# Patient Record
Sex: MALE | Race: WHITE | NOT HISPANIC OR LATINO | Employment: FULL TIME | URBAN - METROPOLITAN AREA
[De-identification: names, ages, dates, MRNs, and addresses within clinical notes are randomized per-mention and may not be internally consistent; named-entity substitution may affect disease eponyms.]

---

## 2018-04-07 ENCOUNTER — APPOINTMENT (EMERGENCY)
Dept: CT IMAGING | Facility: HOSPITAL | Age: 58
End: 2018-04-07
Payer: COMMERCIAL

## 2018-04-07 ENCOUNTER — HOSPITAL ENCOUNTER (EMERGENCY)
Facility: HOSPITAL | Age: 58
Discharge: HOME | End: 2018-04-07
Attending: EMERGENCY MEDICINE | Admitting: EMERGENCY MEDICINE
Payer: COMMERCIAL

## 2018-04-07 VITALS
BODY MASS INDEX: 34.53 KG/M2 | HEIGHT: 70 IN | OXYGEN SATURATION: 94 % | HEART RATE: 68 BPM | WEIGHT: 241.18 LBS | RESPIRATION RATE: 18 BRPM | SYSTOLIC BLOOD PRESSURE: 127 MMHG | DIASTOLIC BLOOD PRESSURE: 89 MMHG | TEMPERATURE: 97.7 F

## 2018-04-07 DIAGNOSIS — M54.41 ACUTE RIGHT-SIDED LOW BACK PAIN WITH RIGHT-SIDED SCIATICA: Primary | ICD-10-CM

## 2018-04-07 LAB
EXT BILIRUBIN, UA: NEGATIVE
EXT BLOOD URINE: NEGATIVE
EXT GLUCOSE, UA: 250
EXT KETONES: NEGATIVE
EXT NITRITE, UA: NEGATIVE
EXT PH, UA: 7
EXT PROTEIN, UA: NEGATIVE
EXT SPECIFIC GRAVITY, UA: 1.01
EXT UROBILINOGEN: 0.2
WBC # BLD EST: NEGATIVE 10*3/UL

## 2018-04-07 PROCEDURE — 72131 CT LUMBAR SPINE W/O DYE: CPT

## 2018-04-07 PROCEDURE — 96375 TX/PRO/DX INJ NEW DRUG ADDON: CPT

## 2018-04-07 PROCEDURE — 81002 URINALYSIS NONAUTO W/O SCOPE: CPT | Performed by: PHYSICIAN ASSISTANT

## 2018-04-07 PROCEDURE — 96374 THER/PROPH/DIAG INJ IV PUSH: CPT

## 2018-04-07 PROCEDURE — 99284 EMERGENCY DEPT VISIT MOD MDM: CPT

## 2018-04-07 RX ORDER — KETOROLAC TROMETHAMINE 30 MG/ML
30 INJECTION, SOLUTION INTRAMUSCULAR; INTRAVENOUS ONCE
Status: COMPLETED | OUTPATIENT
Start: 2018-04-07 | End: 2018-04-07

## 2018-04-07 RX ORDER — OXYCODONE HYDROCHLORIDE AND ACETAMINOPHEN 5; 325 MG/1; MG/1
1 TABLET ORAL EVERY 4 HOURS PRN
Qty: 18 TABLET | Refills: 0 | Status: SHIPPED | OUTPATIENT
Start: 2018-04-07 | End: 2018-04-10

## 2018-04-07 RX ADMIN — HYDROMORPHONE HYDROCHLORIDE 0.5 MG: 1 INJECTION, SOLUTION INTRAMUSCULAR; INTRAVENOUS; SUBCUTANEOUS at 16:30

## 2018-04-07 RX ADMIN — KETOROLAC TROMETHAMINE 30 MG: 30 INJECTION, SOLUTION INTRAMUSCULAR at 16:27

## 2018-04-07 NOTE — ED PROVIDER NOTES
History  Chief Complaint   Patient presents with    Back Pain     Patient c/o right lower back pain that radiates down right side of leg along with numbness and tingling  62 y o  male with past medical history significant for asthma and chronic left sided back pain with sciatica presents to the Emergency Department with chief complaint of right sided back pain  Onset of symptoms is reported as 1 5 weeks ago  Location of symptoms is reported as right lower back with radiation down buttocks and down right leg  Quality of symptoms is reported as sharp tight pain in back with sharp shooting pain and numbness to right leg  Severity of symptoms is reported as moderate-severe  Associated symptoms:  Denies urinary retention  Denies bowel or bladder incontinence  Denies abdominal pain  Denies fevers  denies lower extremity paralysis, or weakness  Denies dysuria, urinary frequency or hematuria  Modifiers: Movement, bending and twisting exacerbate pain  Rest partially relieves pain  Context:  Patient reports that he has had similar symptoms to his left lower back - he states RIGHT sided symptoms started 1 5 weeks ago - states he was PCP this past week who did xrays in the office which reportedly showed arthritis changes but no fracture - patient was treated with meloxicam and prednisone but he states symptoms are getting worse despite taking these medications  Denies any recent fall, injury or trauma  Reports he has never had back pain symptoms on the right side  Review of past visit history via [facility] demonstrates patient last presented to ED on 3/8/2018 with back pain but he LWBS             History provided by:  Patient and EMS personnel   used: No    Back Pain   Associated symptoms: numbness    Associated symptoms: no abdominal pain, no chest pain, no dysuria, no fever, no headaches and no weakness        None       Past Medical History:   Diagnosis Date    Asthma     Back pain Past Surgical History:   Procedure Laterality Date    KNEE SURGERY         History reviewed  No pertinent family history  I have reviewed and agree with the history as documented  Social History   Substance Use Topics    Smoking status: Never Smoker    Smokeless tobacco: Never Used    Alcohol use Yes      Comment: occasionally        Review of Systems   Constitutional: Negative for activity change, appetite change, chills, diaphoresis, fatigue, fever and unexpected weight change  HENT: Negative for congestion, dental problem, drooling, ear discharge, ear pain, facial swelling, hearing loss, mouth sores, nosebleeds, postnasal drip, rhinorrhea, sinus pain, sinus pressure, sneezing, sore throat, tinnitus, trouble swallowing and voice change  Eyes: Negative for photophobia, pain, discharge, redness, itching and visual disturbance  Respiratory: Negative for apnea, cough, choking, chest tightness, shortness of breath, wheezing and stridor  Cardiovascular: Negative for chest pain, palpitations and leg swelling  Gastrointestinal: Negative for abdominal distention, abdominal pain, anal bleeding, blood in stool, constipation, diarrhea, nausea, rectal pain and vomiting  Endocrine: Negative for cold intolerance, heat intolerance, polydipsia, polyphagia and polyuria  Genitourinary: Negative for decreased urine volume, difficulty urinating, discharge, dysuria, flank pain, frequency, genital sores, hematuria, penile pain, penile swelling, scrotal swelling, testicular pain and urgency  Musculoskeletal: Positive for back pain and gait problem  Negative for arthralgias, joint swelling, myalgias, neck pain and neck stiffness  Skin: Negative for color change, pallor, rash and wound  Allergic/Immunologic: Negative for environmental allergies, food allergies and immunocompromised state  Neurological: Positive for numbness   Negative for dizziness, tremors, seizures, syncope, facial asymmetry, speech difficulty, weakness, light-headedness and headaches  Hematological: Negative for adenopathy  Does not bruise/bleed easily  Psychiatric/Behavioral: Negative for agitation, confusion and hallucinations  The patient is not nervous/anxious  All other systems reviewed and are negative  Physical Exam  ED Triage Vitals   Temperature Pulse Respirations Blood Pressure SpO2   04/07/18 1447 04/07/18 1447 04/07/18 1447 04/07/18 1449 04/07/18 1447   97 7 °F (36 5 °C) 68 18 (!) 184/112 97 %      Temp Source Heart Rate Source Patient Position - Orthostatic VS BP Location FiO2 (%)   04/07/18 1447 04/07/18 1447 04/07/18 1447 04/07/18 1447 --   Oral Monitor Lying Right arm       Pain Score       04/07/18 1630       Worst Possible Pain           Orthostatic Vital Signs  Vitals:    04/07/18 1645 04/07/18 1700 04/07/18 1715 04/07/18 1730   BP: 150/88 153/84  127/89   Pulse: 68 71 69 68   Patient Position - Orthostatic VS:           Physical Exam   Constitutional: He is oriented to person, place, and time  He appears well-developed and well-nourished  No distress  BP (!) 184/112 (BP Location: Right arm)   Pulse 68   Temp 97 7 °F (36 5 °C) (Oral)   Resp 18   Ht 5' 10" (1 778 m)   Wt 109 kg (241 lb 2 9 oz)   SpO2 97%   BMI 34 61 kg/m²     Vs reviewed  bp hypertensive  Pulse normal  Suspected elevated bp secondary to back pain  Will observe  HENT:   Head: Normocephalic and atraumatic  Right Ear: External ear normal    Left Ear: External ear normal    Nose: Nose normal    Mouth/Throat: Oropharynx is clear and moist  No oropharyngeal exudate  Eyes: Conjunctivae and EOM are normal  Pupils are equal, round, and reactive to light  Right eye exhibits no discharge  Left eye exhibits no discharge  No scleral icterus  Neck: Normal range of motion  Neck supple  No JVD present  No tracheal deviation present  No thyromegaly present  Cardiovascular: Normal rate, regular rhythm and intact distal pulses  Pulmonary/Chest: Effort normal and breath sounds normal  No stridor  No respiratory distress  He has no wheezes  He has no rales  He exhibits no tenderness  Abdominal: Soft  Bowel sounds are normal  He exhibits no distension and no mass  There is no tenderness  There is no rebound and no guarding  No hernia  Musculoskeletal: He exhibits tenderness  He exhibits no edema or deformity  There is no midline thoracic or lumbar spinal tenderness to palpation  No bony step offs or deformities on palpation  No saddle anesthesia  Nontender over the costovertebral angle bilaterally  Bilateral lower extremities: The patient is neurovascularly intact in the superficial and deep peroneal, sural, tibial, and saphenous nerve distributions there is normal sensation and good capillary refill within the toes  Strength 5/5 normal to bilateral lower extremities  FROM throughout BLE  No posterior calf pain or palpable cords  No ankle clonus  Patient reports pain with straight leg raise test to right lower extremity  Lymphadenopathy:     He has no cervical adenopathy  Neurological: He is alert and oriented to person, place, and time  He displays normal reflexes  No cranial nerve deficit or sensory deficit  He exhibits normal muscle tone  Coordination normal    Skin: Skin is warm and dry  Capillary refill takes less than 2 seconds  No rash noted  He is not diaphoretic  No erythema  No pallor  Psychiatric: He has a normal mood and affect  His behavior is normal  Judgment and thought content normal    Nursing note and vitals reviewed        ED Medications  Medications   ketorolac (TORADOL) injection 30 mg (30 mg Intravenous Given 4/7/18 1627)   HYDROmorphone (DILAUDID) injection 0 5 mg (0 5 mg Intravenous Given 4/7/18 1630)       Diagnostic Studies  Results Reviewed     Procedure Component Value Units Date/Time    POCT urinalysis dipstick [[id number]]  (Normal) Resulted:  04/07/18 1622    Lab Status:  Final result Specimen: Urine Updated:  04/07/18 1623     EXT Glucose, UA (Ref: Negative) 250     EXT Bilirubin, UA (Ref: Negative) negative     EXT Ketones, UA (Ref: Negative) negative     EXT Spec Grav, UA 1 015     EXT Blood, UA (Ref: Negative) negative     EXT pH, UA 7     EXT Protein, UA (Ref: Negative) negative     EXT Urobilinogen, UA (Ref: 0 2- 1 0) 0 2     EXT Leukocytes, UA (Ref: Negative) negative     EXT Nitrite, UA (Ref: Negative) negative                 CT lumbar spine without contrast   Final Result by Zenon Loaiza MD (04/07 3836)      Mild lumbar spondylosis without acute fracture or subluxation  Right neural foraminal disc protrusion at L4-5  Workstation performed: [id number]                    Procedures  Procedures       Phone Contacts  ED Phone Contact    ED Course  ED Course as of Apr 07 2045   Sat Apr 07, 2018   1638 Re-evaluation:  Urinalysis reviewed and unremarkable  Reviewed CT scan of the lumbar spine result showing lumbar spine disc bulge  There is right-sided nerve root involvement noted  I discussed these results with the patient at bedside  Patient just now received dose of analgesic pain medication  I have asked the nurse to reassess the patient's pain level in 20 minutes to re-evaluate efficacy  Discussed if pain controlled will treat with analgesics, continue meloxicam and prednisone as previously prescribed  Discussed follow-up with primary care physician and back specialist for further evaluation and treatment of symptoms  1714 Re-evaluation patient pain improved after analgesics given in ED  Patient has family member to transport home  Discussed outpatient follow up   Standard narcotic precautions given                                   MDM  Number of Diagnoses or Management Options  Diagnosis management comments: ddx includes but is not limited to:  Myofascial pain, diskogenic pain, radicular pain, muscle spasm, vertebral compression fracture, spinal stenosis, spondylosis, cancer, osteoporosis, osteomyelitis, OA, RA, consider but doubt epidural abscess, cauda equina  Consider but doubt non spinal causes of pain: doubt uti, doubt pyelonephritis, doubt kidney stone, AAA or dissection  Plan given age and recent negative xrays will obtain ct scan Lumbar spine  Amount and/or Complexity of Data Reviewed  Clinical lab tests: ordered and reviewed  Tests in the radiology section of CPT®: ordered and reviewed  Obtain history from someone other than the patient: yes (EMS)  Review and summarize past medical records: yes      CritCare Time    Disposition  Final diagnoses:   Acute right-sided low back pain with right-sided sciatica     Time reflects when diagnosis was documented in both MDM as applicable and the Disposition within this note     Time User Action Codes Description Comment    4/7/2018  5:15 PM Karlene Skiff Add [J01 88] Acute right-sided low back pain with right-sided sciatica       ED Disposition     ED Disposition Condition Comment    Discharge  Jordana Ospina discharge to home/self care      Condition at discharge: Stable        Follow-up Information     Follow up With Specialties Details Why Contact Info Additional Dian Mcmillan MD  Call in 1 day for further evaluation of symptoms [address] [facility] [address] [facility] [address] [facility] [address], [place] Orthopedic Surgery Call in 1 day for further evaluation of symptoms [address] [place] [address] [address] Emergency Department Emergency Medicine Go to If symptoms worsen [address] [place] ED, [address], [address]        Discharge Medication List as of 4/7/2018  5:27 PM      START taking these medications    Details   oxyCODONE-acetaminophen (PERCOCET) 5-325 mg per tablet Take 1 tablet by mouth every 4 (four) hours as needed for moderate pain (back pain/initial rx) for up to 3 days Label no driving no etoh  Initial rx  Dx: Max Daily Amount: 6 tablets, Starting Sat 4/7/2018, Until Tue 4/10/2018, Print           No discharge procedures on file      ED Provider  Electronically Signed by           Marely Solis PA-C  04/07/18 2040

## 2018-04-07 NOTE — DISCHARGE INSTRUCTIONS
Back Pain   WHAT YOU NEED TO KNOW:   Back pain is common  It can be caused by many conditions, such as arthritis or the breakdown of spinal discs  Your risk for back pain is increased by injuries, lack of activity, or repeated bending and twisting  You may feel sore or stiff on one or both sides of your back  The pain may spread to your buttocks or thighs  DISCHARGE INSTRUCTIONS:   Medicines:   · NSAIDs  help decrease swelling and pain  This medicine is available with or without a doctor's order  NSAIDs can cause stomach bleeding or kidney problems in certain people  If you take blood thinner medicine, always ask your healthcare provider if NSAIDs are safe for you  Always read the medicine label and follow directions  · Acetaminophen  decreases pain  It is available without a doctor's order  Ask how much to take and how often to take it  Follow directions  Acetaminophen can cause liver damage if not taken correctly  · Prescription pain medicine  may be given  Ask your healthcare provider how to take this medicine safely  · Take your medicine as directed  Contact your healthcare provider if you think your medicine is not helping or if you have side effects  Tell him or her if you are allergic to any medicine  Keep a list of the medicines, vitamins, and herbs you take  Include the amounts, and when and why you take them  Bring the list or the pill bottles to follow-up visits  Carry your medicine list with you in case of an emergency  Follow up with your healthcare provider in 2 weeks, or as directed:  Write down your questions so you remember to ask them during your visits  How to manage your back pain:   · Apply ice  on your back or affected area for 15 to 20 minutes every hour or as directed  Use an ice pack, or put crushed ice in a plastic bag  Cover it with a towel  Ice helps prevent tissue damage and decreases pain      · Apply heat  on your back or affected area for 20 to 30 minutes every 2 hours for as many days as directed  Heat helps decrease pain and muscle spasms  · Stay active  as much as you can without causing more pain  Bed rest could make your back pain worse  Avoid heavy lifting until your pain is gone  Return to the emergency department if:   · You have pain, numbness, or weakness in one or both legs  · Your pain becomes so severe that you cannot walk  · You cannot control your urine or bowel movements  · You have severe back pain with chest pain  · You have severe back pain, nausea, and vomiting  · You have severe back pain that spreads to your side or genital area  Contact your healthcare provider if:   · You have back pain that does not get better with rest and pain medicine  · You have a fever  · You have pain that worsens when you are on your back or when you rest     · You have pain that worsens when you cough or sneeze  · You lose weight without trying  · You have questions or concerns about your condition or care  © 2017 [facility] Information is for End User's use only and may not be sold, redistributed or otherwise used for commercial purposes  All illustrations and images included in CareNotes® are the copyrighted property of [employer] M [employer]  or [employer]  The above information is an  only  It is not intended as medical advice for individual conditions or treatments  Talk to your doctor, nurse or pharmacist before following any medical regimen to see if it is safe and effective for you  Sciatica   WHAT YOU NEED TO KNOW:   Sciatica is a condition that causes pain along your sciatic nerve  The sciatic nerve runs from your spine through both sides of your buttocks  It then runs down the back of your thigh, into your lower leg and foot  Your sciatic nerve may be compressed, inflamed, irritated, or stretched          DISCHARGE INSTRUCTIONS:   Medicines:   · NSAIDs:  These medicines decrease swelling and pain  NSAIDs are available without a doctor's order  Ask your healthcare provider which medicine is right for you  Ask how much to take and when to take it  Take as directed  NSAIDs can cause stomach bleeding or kidney problems if not taken correctly  · Acetaminophen: This medicine decreases pain  Acetaminophen is available without a doctor's order  Ask how much to take and when to take it  Follow directions  Acetaminophen can cause liver damage if not taken correctly  · Muscle relaxers  help decrease pain and muscle spasms  · Take your medicine as directed  Contact your healthcare provider if you think your medicine is not helping or if you have side effects  Tell him of her if you are allergic to any medicine  Keep a list of the medicines, vitamins, and herbs you take  Include the amounts, and when and why you take them  Bring the list or the pill bottles to follow-up visits  Carry your medicine list with you in case of an emergency  Follow up with your healthcare provider as directed:  Write down your questions so you remember to ask them during your visits  Manage your symptoms:   · Activity:  Decrease your activity  Do not lift heavy objects or twist your back for at least 6 weeks  Slowly return to your usual activity  · Ice:  Ice helps decrease swelling and pain  Ice may also help prevent tissue damage  Use an ice pack, or put crushed ice in a plastic bag  Cover it with a towel and place it on your low back or leg for 15 to 20 minutes every hour or as directed  · Heat:  Heat helps decrease pain and muscle spasms  Apply heat on the area for 20 to 30 minutes every 2 hours for as many days as directed  · Physical therapy:  You may need to see physical therapist to teach you exercises to help improve movement and strength, and to decrease pain  An occupational therapist teaches you skills to help with your daily activities  · Use assistive devices if directed:   You may need to wear back support, such as a back brace  You may need crutches, a cane, or a walker to decrease stress on your lower back and leg muscles  Ask your healthcare provider for more information about assistive devices and how to use them correctly  Self-care:   · Avoid pressure on your back and legs:  Do not  lift heavy objects, or stand or sit for long periods of time  · Lift objects safely:  Keep your back straight and bend your knees when you  an object  Do not bend or twist your back when you lift  · Maintain a healthy weight:  Ask your healthcare provider how much you should weigh  Ask him to help you create a weight loss plan if you are overweight  · Exercise:  Ask your healthcare provider about the best stretching, warmup, and exercise plan for you  Contact your healthcare provider if:   · You have pain in your lower back at night or when resting  · You have pain in your lower back with numbness below the knee  · You have weakness in one leg only  · You have questions or concerns about your condition or care  Return to the emergency department if:   · You have trouble holding back your urine or bowel movements  · You have weakness in both legs  · You have numbness in your groin or buttocks  © 2017 [employer] [employer]  Information is for End User's use only and may not be sold, redistributed or otherwise used for commercial purposes  All illustrations and images included in CareNotes® are the copyrighted property of A D [employer] M , Inc  or [employer]  The above information is an  only  It is not intended as medical advice for individual conditions or treatments  Talk to your doctor, nurse or pharmacist before following any medical regimen to see if it is safe and effective for you

## 2018-04-18 VITALS
WEIGHT: 232 LBS | DIASTOLIC BLOOD PRESSURE: 99 MMHG | SYSTOLIC BLOOD PRESSURE: 133 MMHG | HEIGHT: 70 IN | HEART RATE: 91 BPM | BODY MASS INDEX: 33.21 KG/M2

## 2018-04-18 DIAGNOSIS — M62.9 HAMSTRING TIGHTNESS OF BOTH LOWER EXTREMITIES: ICD-10-CM

## 2018-04-18 DIAGNOSIS — M51.26 LUMBAR DISC HERNIATION: Primary | ICD-10-CM

## 2018-04-18 DIAGNOSIS — M48.061 NEUROFORAMINAL STENOSIS OF LUMBAR SPINE: ICD-10-CM

## 2018-04-18 DIAGNOSIS — M51.26 BULGE OF LUMBAR DISC WITHOUT MYELOPATHY: ICD-10-CM

## 2018-04-18 DIAGNOSIS — M62.838 SPASM OF RIGHT PIRIFORMIS MUSCLE: ICD-10-CM

## 2018-04-18 DIAGNOSIS — M62.830 LUMBAR PARASPINAL MUSCLE SPASM: ICD-10-CM

## 2018-04-18 PROCEDURE — 99203 OFFICE O/P NEW LOW 30 MIN: CPT | Performed by: FAMILY MEDICINE

## 2018-04-18 RX ORDER — CYCLOBENZAPRINE HCL 10 MG
10 TABLET ORAL 3 TIMES DAILY PRN
Qty: 30 TABLET | Refills: 1 | Status: SHIPPED | OUTPATIENT
Start: 2018-04-18 | End: 2021-04-06

## 2018-04-18 RX ORDER — MELOXICAM 15 MG/1
TABLET ORAL
COMMUNITY
Start: 2018-03-23 | End: 2021-04-06

## 2018-04-18 RX ORDER — NAPROXEN 500 MG/1
500 TABLET ORAL 2 TIMES DAILY WITH MEALS
Qty: 30 TABLET | Refills: 1 | Status: SHIPPED | OUTPATIENT
Start: 2018-04-18 | End: 2021-04-21

## 2018-04-18 RX ORDER — ALBUTEROL SULFATE 90 UG/1
2 AEROSOL, METERED RESPIRATORY (INHALATION)
COMMUNITY
Start: 2016-08-12

## 2018-04-18 RX ORDER — CYCLOBENZAPRINE HCL 10 MG
TABLET ORAL
COMMUNITY
Start: 2018-04-03 | End: 2021-04-06

## 2018-04-18 RX ORDER — DEXTROMETHORPHAN HYDROBROMIDE AND PROMETHAZINE HYDROCHLORIDE 15; 6.25 MG/5ML; MG/5ML
SYRUP ORAL
COMMUNITY
Start: 2018-01-11 | End: 2021-04-06

## 2018-04-18 RX ORDER — TIMOLOL MALEATE 5 MG/ML
SOLUTION/ DROPS OPHTHALMIC
COMMUNITY
Start: 2016-08-19

## 2018-04-18 RX ORDER — HYDROCODONE BITARTRATE AND ACETAMINOPHEN 7.5; 3 MG/1; MG/1
TABLET ORAL
COMMUNITY
Start: 2018-02-02 | End: 2021-04-21

## 2018-04-18 RX ORDER — CYCLOBENZAPRINE HCL 10 MG
10 TABLET ORAL
COMMUNITY
Start: 2018-04-03 | End: 2021-04-06

## 2018-04-18 NOTE — PROGRESS NOTES
Assessment/Plan:  Assessment/Plan   Diagnoses and all orders for this visit:    Lumbar disc herniation  -     Ambulatory referral to Physical Therapy; Future  -     naproxen (NAPROSYN) 500 mg tablet; Take 1 tablet (500 mg total) by mouth 2 (two) times a day with meals    Neuroforaminal stenosis of lumbar spine  -     Ambulatory referral to Physical Therapy; Future  -     naproxen (NAPROSYN) 500 mg tablet; Take 1 tablet (500 mg total) by mouth 2 (two) times a day with meals    Bulge of lumbar disc without myelopathy  -     Ambulatory referral to Physical Therapy; Future    Spasm of right piriformis muscle  -     cyclobenzaprine (FLEXERIL) 10 mg tablet; Take 1 tablet (10 mg total) by mouth 3 (three) times a day as needed for muscle spasms  -     Ambulatory referral to Physical Therapy; Future    Lumbar paraspinal muscle spasm  -     cyclobenzaprine (FLEXERIL) 10 mg tablet; Take 1 tablet (10 mg total) by mouth 3 (three) times a day as needed for muscle spasms  -     Ambulatory referral to Physical Therapy; Future    Hamstring tightness of both lower extremities  -     cyclobenzaprine (FLEXERIL) 10 mg tablet; Take 1 tablet (10 mg total) by mouth 3 (three) times a day as needed for muscle spasms  -     Ambulatory referral to Physical Therapy; Future    Other orders  -     albuterol (PROVENTIL HFA,VENTOLIN HFA) 90 mcg/act inhaler; Inhale 2 puffs  -     cyclobenzaprine (FLEXERIL) 10 mg tablet; Take 10 mg by mouth  -     cyclobenzaprine (FLEXERIL) 10 mg tablet;   -     HYDROcodone-acetaminophen (XODOL) 7 5-300 MG per tablet; Earliest Fill Date: 2/2/18   -     meloxicam (MOBIC) 15 mg tablet;   -     promethazine-dextromethorphan (PHENERGAN-DM) 6 25-15 mg/5 mL oral syrup;   -     timolol (TIMOPTIC) 0 5 % ophthalmic solution;         59-year-old male with new onset low back pain  Discussed with patient physical exam, imaging results, impression, and plan    CT of the lumbar spine is noted for multilevel diffuse disc bulging from L2-S1 with L4-L5 right neural foraminal protrusion  His physical exam is noted for bilateral lumbar paraspinal hypertonicity, right piriformis tenderness and spasm, and tightness in the hamstrings of both lower extremities  I discussed with him treatment regimen of anti-inflammatory, muscle relaxant, and physical therapy to which he agreed  He is to take naproxen 500 mg twice daily with food consistently for 2 weeks, cyclobenzaprine 10 mg at night may titrate 3 times a day if tolerated, and start physical therapy as soon as possible and do home exercises as directed  Will follow up with me in 6 weeks at which point he will be re-evaluated  Subjective:   Patient ID: Timothy Roque is a 62 y o  male  Chief Complaint   Patient presents with    Lower Back - Pain       60-year-old male presents for evaluation of low back pain  Of more than 1 month duration  He reports sensation of discomfort in the low back which he describes as feeling stiff  He denies any trauma or inciting event and onset of symptoms was gradual   Discomfort was described as localized to the low back, constant, associated with stiffness, associated with left lower extremity tingling, and relieved with changing positions  He states that left lower extremity tingling resolved and then migrated to the right lower extremity  Pain in right lower extremity is described as starting in the right buttock as sharp, radiating down around the thigh to the anterior leg and down to the foot, and worse with prolonged standing  He presented to the emergency room and had CT of the lumbar spine done and showed diffuse disc bulge at L2-L3, L3-L4, L4-L5, and L5-S1, with  L4-L5 right neural foraminal disc protrusion  He was prescribed medication for pain and advised to follow up with orthopedic care  He reports mild improvement in pain  Back Pain   This is a new problem  The current episode started more than 1 month ago   The problem occurs constantly  The problem has been gradually improving  Associated symptoms include numbness  Pertinent negatives include no weakness  The symptoms are aggravated by walking, standing and twisting  He has tried rest and oral narcotics for the symptoms  The treatment provided mild relief  The following portions of the patient's history were reviewed and updated as appropriate: He  has a past medical history of Asthma and Back pain  He  has a past surgical history that includes Knee surgery  His family history includes Hypertension in his father and mother  He  reports that he has never smoked  He has never used smokeless tobacco  He reports that he drinks alcohol  He reports that he does not use drugs  He is allergic to iodinated diagnostic agents and omnipaque [iohexol]       Review of Systems   Musculoskeletal: Positive for back pain  Neurological: Positive for numbness  Negative for weakness  Objective:  Vitals:    04/18/18 1516   BP: 133/99   Pulse: 91   Weight: 105 kg (232 lb)   Height: 5' 10" (1 778 m)     Right Hip Exam     Range of Motion   The patient has normal right hip ROM  Tests   ISRAEL: negative    Comments:    Negative FADDIR      Left Hip Exam     Range of Motion   The patient has normal left hip ROM  Tests   ISRAEL: negative    Comments:    Negative FADDIR      Back Exam     Tenderness   Back tenderness location: Lumbar paraspinal L1-L5 bilaterally, right greater than left  Range of Motion   The patient has normal back ROM  Muscle Strength   The patient has normal back strength  Tests   Straight leg raise right: negative  Straight leg raise left: negative    Reflexes   Patellar: 2/4    Other   Sensation: normal  Gait: normal             Physical Exam   Constitutional: He is oriented to person, place, and time  He appears well-developed  No distress  HENT:   Head: Normocephalic and atraumatic  Eyes: Conjunctivae are normal    Neck: No tracheal deviation present  Cardiovascular: Normal rate  Pulmonary/Chest: Effort normal  No respiratory distress  Abdominal: He exhibits no distension  Neurological: He is alert and oriented to person, place, and time  Skin: Skin is warm and dry  Psychiatric: He has a normal mood and affect  His behavior is normal        I have personally reviewed pertinent films in PACS and my interpretation is Multilevel diffuse disc bulge or lumbar spine with L4-L5 right neural foraminal protrusion

## 2018-05-10 ENCOUNTER — EVALUATION (OUTPATIENT)
Dept: PHYSICAL THERAPY | Facility: CLINIC | Age: 58
End: 2018-05-10
Payer: COMMERCIAL

## 2018-05-10 DIAGNOSIS — M48.061 NEUROFORAMINAL STENOSIS OF LUMBAR SPINE: Primary | ICD-10-CM

## 2018-05-10 DIAGNOSIS — M62.9 HAMSTRING TIGHTNESS OF BOTH LOWER EXTREMITIES: ICD-10-CM

## 2018-05-10 DIAGNOSIS — M62.830 LUMBAR PARASPINAL MUSCLE SPASM: ICD-10-CM

## 2018-05-10 DIAGNOSIS — M62.838 SPASM OF RIGHT PIRIFORMIS MUSCLE: ICD-10-CM

## 2018-05-10 DIAGNOSIS — M51.26 LUMBAR DISC HERNIATION: ICD-10-CM

## 2018-05-10 DIAGNOSIS — M51.26 BULGE OF LUMBAR DISC WITHOUT MYELOPATHY: ICD-10-CM

## 2018-05-10 PROCEDURE — G8991 OTHER PT/OT GOAL STATUS: HCPCS | Performed by: PHYSICAL THERAPIST

## 2018-05-10 PROCEDURE — 97162 PT EVAL MOD COMPLEX 30 MIN: CPT | Performed by: PHYSICAL THERAPIST

## 2018-05-10 PROCEDURE — G8990 OTHER PT/OT CURRENT STATUS: HCPCS | Performed by: PHYSICAL THERAPIST

## 2018-05-10 NOTE — PROGRESS NOTES
PT Evaluation     Today's date: 5/10/2018  Patient name: Aileen Turner  : 1960  MRN: [id number]  Referring provider: Weston Sanchez DO  Dx:   Encounter Diagnosis     ICD-10-CM    1  Lumbar disc herniation M51 26 Ambulatory referral to Physical Therapy   2  Neuroforaminal stenosis of lumbar spine M99 83 Ambulatory referral to Physical Therapy   3  Bulge of lumbar disc without myelopathy M51 26 Ambulatory referral to Physical Therapy   4  Spasm of right piriformis muscle M62 838 Ambulatory referral to Physical Therapy   5  Lumbar paraspinal muscle spasm M62 830 Ambulatory referral to Physical Therapy   6  Hamstring tightness of both lower extremities M62 9 Ambulatory referral to Physical Therapy                  Assessment  Impairments: abnormal or restricted ROM, activity intolerance, lacks appropriate home exercise program and pain with function    Assessment details: Patient was provided a home exercise program and demonstrated an understanding of exercises  Patient was advised to stop performing home exercise program if symptoms increase or new complaints developed  Verbal understanding demonstrated regarding home exercise program instructions  Patient would benefit from skilled physical therapy services for prescribed exercises, manual interventions, neuromuscular re-education, education, and modalities as deemed appropriate to assist patient in achieving their maximum level of function  Understanding of Dx/Px/POC: good   Prognosis: good    Goals  STG  1  Decrease pain @ worst by at least two subjective ratings in 4 weeks  2  Increase Trunk AROM to [employer] all planes 4 weeks  3  Patient will report centralizing LE radicular symptoms  4 weeks  LTG  1  Independent with HEP  2  Abolish LE radicular symptoms  4-6 weeks   3  Patient will tolerate all positions prn w/o exacerbation of pain  6- weeks  4   Improved postural awareness and self correction  4-6 weeks         Plan  Patient would benefit from: skilled PT  Planned modality interventions: thermotherapy: hydrocollator packs, H-Wave and TENS  Planned therapy interventions: flexibility, graded exercise, home exercise program, therapeutic exercise, strengthening, stretching, patient education, neuromuscular re-education, manual therapy, joint mobilization and postural training  Frequency: 2x week  Duration in weeks: 6  Treatment plan discussed with: patient  Plan details: Patient response to program will be  Monitored each session and progressed accordingly  Thank you for this referral          Subjective Evaluation    History of Present Illness  Mechanism of injury: Patient reports that he woke insidiously with left LE posterior - calf pain approximately 1 month ago  After 2 weeks, this pain subsided and switched to right le  He notes that when he would put his right heel down to walk , he would getting shootting pains up the right le  He states that he had to walk with a cane for a couple of weeks  He notes that thru this time, he has had stiffness/ numbness sensation across LS region  Now, he reports that he has " a lot of stiffness " across LS region and only intermittent right LE pain off/ on  Prior to 1 month ago, he was healthy, no pain , no issues  He states that he went to ER secondary to severe right le pain 18  CT scan performed      Quality of life: fair ("I work too hard" )    Pain  Current pain ratin (It's stiffness, not pain right now)  At best pain ratin  At worst pain rating: 3  Quality: tight  Relieving factors: change in position  Aggravating factors: sitting  Progression: improved    Social Support  Stairs in house: yes   Lives in: multiple-level home  Lives with: spouse    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)  Hand dominance: right      Diagnostic Tests  CT scan: abnormal  Treatments  Previous treatment: medication  Patient Goals  Patient goals for therapy: decreased pain and independence with ADLs/IADLs          Objective     Special Questions  Positive for disturbed sleep  Negative for bladder dysfunction, bowel dysfunction and saddle (S4) numbness    Postural Observations  Seated posture: fair  Standing posture: fair  Correction of posture: has no consistent effect    Additional Postural Observation Details  Patient with slight fhp, flattened lordosis  Palpation   Left   Tenderness of the lumbar paraspinals  Right Tenderness of the lumbar paraspinals  Tenderness     Additional Tenderness Details  Mild tenderness only with palpation bilateral PSIS   (-) tenderness right Piriformis region  Neurological Testing     Sensation     Lumbar   Left   Intact: light touch    Right   Intact: light touch    Active Range of Motion     Lumbar   Flexion: Active lumbar flexion: mod loss, P, NW  Extension: Active lumbar extension: Min Loss  P, NW  Left lateral flexion: Active left lumbar lateral flexion: nil    NE  Right lateral flexion: Active right lumbar lateral flexion: nil,  P, NW   (produce left symptoms)     Strength/Myotome Testing     Lumbar   Left   Normal strength    Right   Normal strength    Tests       Thoracic   Negative slump  Lumbar   Negative slump  Left   Negative passive SLR  Right   Negative passive SLR  Additional Tests Details  SLR  R = 65 degrees  ( + ) pulling right buttock region reported            L = 70 degrees  - Minimal pulling across left buttock region  RFIS - P, NW  RFIL - better  CIARA - P, NW  CIARA - P, NW  Prone on elbows - ne  Static posturing        General Comments     Lumbar Comments  GAIT - slowed rickey  Decreased stance time on right  Guarded  Transitional movement - prolonged sit - stand = (+) guarding with initial wb right - loosens as he moves about  Precautions: LB tightness reported with intermittent R > L radicular symptoms lateral hip/ lateral calf region       Daily Treatment Diary Manual              Manual hss/piriformis s                                                                     Exercise Diary  5/10            aktc/ dktc 5/10s x 10            ltr / ppt 5/3s x 10/20            90/90 hss 20s x 5            Treadmill or recumbent bike                                                    Prone lying             Prop on elbows             Press ups                                                                                                                                                   Modalities  5/10            mhp  LS seated 10'

## 2018-05-14 ENCOUNTER — OFFICE VISIT (OUTPATIENT)
Dept: PHYSICAL THERAPY | Facility: CLINIC | Age: 58
End: 2018-05-14
Payer: COMMERCIAL

## 2018-05-14 DIAGNOSIS — M48.061 NEUROFORAMINAL STENOSIS OF LUMBAR SPINE: ICD-10-CM

## 2018-05-14 DIAGNOSIS — M62.830 LUMBAR PARASPINAL MUSCLE SPASM: ICD-10-CM

## 2018-05-14 DIAGNOSIS — M51.26 LUMBAR DISC HERNIATION: Primary | ICD-10-CM

## 2018-05-14 DIAGNOSIS — M51.26 BULGE OF LUMBAR DISC WITHOUT MYELOPATHY: ICD-10-CM

## 2018-05-14 DIAGNOSIS — M62.838 SPASM OF RIGHT PIRIFORMIS MUSCLE: ICD-10-CM

## 2018-05-14 DIAGNOSIS — M62.9 HAMSTRING TIGHTNESS OF BOTH LOWER EXTREMITIES: ICD-10-CM

## 2018-05-14 PROCEDURE — 97110 THERAPEUTIC EXERCISES: CPT | Performed by: PHYSICAL THERAPIST

## 2018-05-14 NOTE — PROGRESS NOTES
Daily Note     Today's date: 2018  Patient name: Devin Mays  : 1960  MRN: [id number]  Referring provider: Nisha Berry DO  Dx:   Encounter Diagnosis     ICD-10-CM    1  Lumbar disc herniation M51 26    2  Neuroforaminal stenosis of lumbar spine M99 83    3  Bulge of lumbar disc without myelopathy M51 26    4  Spasm of right piriformis muscle M62 838    5  Lumbar paraspinal muscle spasm M62 830    6  Hamstring tightness of both lower extremities M62 9                   Subjective: Patient reports that he did the exercises 1x over the weekend then lost his papers  Reports feeling "OK" during session  Objective: See treatment diary below  Manual                        Manual hss/piriformis s                                                                                                                             Exercise Diary  5/10 5/14                   aktc/ dktc 5/10s x 10  5/10s x 10                   ltr / ppt 5/3s x 10/20  5/3s x 10/20                   hss 20s x 5  20s x 5                   Treadmill or recumbent bike    bike x 10'                    partial sit ups   15 x 2                    bridging    3s x 30                   Supine dls s/a/l  D69 ea           Piriformis stretch  20s x 3                                                             Prone lying                       Prop on elbows                       Press ups                                                                                                                                                                                                                                                                             Modalities  5/10  5/14                   mhp  LS seated 10'  10'                                                                           Assessment: Tolerated treatment well   Patient exhibited good technique with therapeutic exercises and would benefit from continued PT      Plan: Continue per plan of care  Progress treatment as tolerated

## 2018-05-16 ENCOUNTER — OFFICE VISIT (OUTPATIENT)
Dept: PHYSICAL THERAPY | Facility: CLINIC | Age: 58
End: 2018-05-16
Payer: COMMERCIAL

## 2018-05-16 DIAGNOSIS — M51.26 BULGE OF LUMBAR DISC WITHOUT MYELOPATHY: ICD-10-CM

## 2018-05-16 DIAGNOSIS — M48.061 NEUROFORAMINAL STENOSIS OF LUMBAR SPINE: ICD-10-CM

## 2018-05-16 DIAGNOSIS — M62.830 LUMBAR PARASPINAL MUSCLE SPASM: ICD-10-CM

## 2018-05-16 DIAGNOSIS — M51.26 LUMBAR DISC HERNIATION: Primary | ICD-10-CM

## 2018-05-16 DIAGNOSIS — M62.838 SPASM OF RIGHT PIRIFORMIS MUSCLE: ICD-10-CM

## 2018-05-16 DIAGNOSIS — M62.9 HAMSTRING TIGHTNESS OF BOTH LOWER EXTREMITIES: ICD-10-CM

## 2018-05-16 PROCEDURE — 97110 THERAPEUTIC EXERCISES: CPT

## 2018-05-16 NOTE — PROGRESS NOTES
Daily Note     Today's date: 2018  Patient name: Bernie Rivera  : 1960  MRN: [id number]  Referring provider: Angelina Quevedo DO  Dx:   Encounter Diagnosis     ICD-10-CM    1  Lumbar disc herniation M51 26    2  Neuroforaminal stenosis of lumbar spine M99 83    3  Bulge of lumbar disc without myelopathy M51 26    4  Spasm of right piriformis muscle M62 838    5  Lumbar paraspinal muscle spasm M62 830    6  Hamstring tightness of both lower extremities M62 9                   Subjective: SPR=0/10  Patient denied lumbar symptoms, but noted post "workout soreness"        Objective: See treatment diary below  Manual                        Manual hss/piriformis s                                                                                                                             Exercise Diary  5/10 5/14 5/16                 aktc/ dktc 10s x 10  510s x 10 :05  10x each                 ltr / ppt 5/3s x 10/20  5/3s x 10/20 :10/:05  10x/20x                 hss 20s x 5  20s x 5 :20  5x                 Treadmill or recumbent bike    bike x 10' UprightBike  10'                  partial sit ups   15 x 2 15x2                  bridging    3s x 30 :03  30x                 Supine dls s/a/l   U95 ea 49V each                 Piriformis stretch   20s x 3 :20  3x                                                                                         Prone lying                       Prop on elbows                       Press ups                                                                                                                                                                                                                                                                             Modalities  5/10  5/14 5/16                 mhp  LS seated 10'  10' 10'                                                                        Assessment: Tolerated treatment without limitations or reports of exacerbation of symptoms  Patient exhibited SOB post bike performance; low cardiac tolerance  Patient tends to hold his breath during TE; patient required vc's for proper technique  Patient exhibited good technique with therapeutic exercises        Plan: Continue per plan of care

## 2018-05-21 ENCOUNTER — OFFICE VISIT (OUTPATIENT)
Dept: PHYSICAL THERAPY | Facility: CLINIC | Age: 58
End: 2018-05-21
Payer: COMMERCIAL

## 2018-05-21 DIAGNOSIS — M51.26 LUMBAR DISC HERNIATION: Primary | ICD-10-CM

## 2018-05-21 DIAGNOSIS — M51.26 BULGE OF LUMBAR DISC WITHOUT MYELOPATHY: ICD-10-CM

## 2018-05-21 DIAGNOSIS — M62.9 HAMSTRING TIGHTNESS OF BOTH LOWER EXTREMITIES: ICD-10-CM

## 2018-05-21 DIAGNOSIS — M62.830 LUMBAR PARASPINAL MUSCLE SPASM: ICD-10-CM

## 2018-05-21 DIAGNOSIS — M48.061 NEUROFORAMINAL STENOSIS OF LUMBAR SPINE: ICD-10-CM

## 2018-05-21 DIAGNOSIS — M62.838 SPASM OF RIGHT PIRIFORMIS MUSCLE: ICD-10-CM

## 2018-05-21 PROCEDURE — 97140 MANUAL THERAPY 1/> REGIONS: CPT | Performed by: PHYSICAL THERAPIST

## 2018-05-21 PROCEDURE — 97110 THERAPEUTIC EXERCISES: CPT | Performed by: PHYSICAL THERAPIST

## 2018-05-21 NOTE — PROGRESS NOTES
Daily Note     Today's date: 2018  Patient name: Daniel Borjas  : 1960  MRN: [id number]  Referring provider: Jordana Streeter DO  Dx:   Encounter Diagnosis     ICD-10-CM    1  Lumbar disc herniation M51 26    2  Neuroforaminal stenosis of lumbar spine M99 83    3  Bulge of lumbar disc without myelopathy M51 26    4  Spasm of right piriformis muscle M62 838    5  Lumbar paraspinal muscle spasm M62 830    6  Hamstring tightness of both lower extremities M62 9                   Subjective: Patient reports that he starts the session with " a little bit of stiffness "  Which then "goes right away"           Objective: See treatment diary below  Manual                        Manual hss/quad s db                                                                                                                           Exercise Diary  5/10 5/14 5/16  5/21               aktc/ dktc 5/10s x 10  5/10s x 10 :05  10x each  5/10s x 10               ltr / ppt 5/3s x 10/20  5/3s x 10/20 :10/:05  10x/20x  5/3' x 10/20               hss 20s x 5  20s x 5 :20  5x  20s x 5               Treadmill or recumbent bike    bike x 10' Upright Bike  10'  bike 10'                partial sit ups   15 x 2 15x2  15 x 2                bridging    3s x 30 :03  30x  3s x30               Supine dls a/l   Q58 ea 83H each  20x               Piriformis stretch   20s x 3 :20  3x  20s x 3                                                                                       Prop on elbows        2'               Press ups        10x2                                        squats        x20                                                                                                                                                                                                                     Modalities  5/10  5/14 5/16  5/ 21               mhp  LS seated 10'  10' 10'  10'                                                                    Assessment: Tolerated treatment well overall -  Patient with tightness bilateral quads/ hs with manual stretching produced  Good response to session today  Plan: Continue per plan of care  Continue to progress pres each session - add weight for dls

## 2018-05-24 ENCOUNTER — APPOINTMENT (OUTPATIENT)
Dept: PHYSICAL THERAPY | Facility: CLINIC | Age: 58
End: 2018-05-24
Payer: COMMERCIAL

## 2018-05-25 ENCOUNTER — APPOINTMENT (OUTPATIENT)
Dept: PHYSICAL THERAPY | Facility: CLINIC | Age: 58
End: 2018-05-25
Payer: COMMERCIAL

## 2018-05-29 ENCOUNTER — APPOINTMENT (OUTPATIENT)
Dept: PHYSICAL THERAPY | Facility: CLINIC | Age: 58
End: 2018-05-29
Payer: COMMERCIAL

## 2018-05-30 ENCOUNTER — OFFICE VISIT (OUTPATIENT)
Dept: PHYSICAL THERAPY | Facility: CLINIC | Age: 58
End: 2018-05-30
Payer: COMMERCIAL

## 2018-05-30 DIAGNOSIS — M51.26 LUMBAR DISC HERNIATION: Primary | ICD-10-CM

## 2018-05-30 DIAGNOSIS — M48.061 NEUROFORAMINAL STENOSIS OF LUMBAR SPINE: ICD-10-CM

## 2018-05-30 DIAGNOSIS — M62.838 SPASM OF RIGHT PIRIFORMIS MUSCLE: ICD-10-CM

## 2018-05-30 DIAGNOSIS — M51.26 BULGE OF LUMBAR DISC WITHOUT MYELOPATHY: ICD-10-CM

## 2018-05-30 DIAGNOSIS — M62.830 LUMBAR PARASPINAL MUSCLE SPASM: ICD-10-CM

## 2018-05-30 DIAGNOSIS — M62.9 HAMSTRING TIGHTNESS OF BOTH LOWER EXTREMITIES: ICD-10-CM

## 2018-05-30 PROCEDURE — 97110 THERAPEUTIC EXERCISES: CPT

## 2018-05-30 PROCEDURE — 97112 NEUROMUSCULAR REEDUCATION: CPT

## 2018-05-30 PROCEDURE — 97140 MANUAL THERAPY 1/> REGIONS: CPT

## 2018-05-30 NOTE — PROGRESS NOTES
Daily Note     Today's date: 2018  Patient name: Tiffani Elizalde  : 1960  MRN: [id number]  Referring provider: Le Wilson DO  Dx:   Encounter Diagnosis     ICD-10-CM    1  Lumbar disc herniation M51 26    2  Neuroforaminal stenosis of lumbar spine M99 83    3  Bulge of lumbar disc without myelopathy M51 26    4  Spasm of right piriformis muscle M62 838    5  Lumbar paraspinal muscle spasm M62 830    6  Hamstring tightness of both lower extremities M62 9                   Subjective: Patient reported bilateral gluteal discomfort post last intervention, that dissipated post 3 days  SPR upon presentation =0/10        Objective: See treatment diary below    Manual                        Manual hss/quad s db LA PTA                                                                                                                          Exercise Diary  5/10 5/14 5/16  5/21 5/30             aktc/ dktc 5/10s x 10  5/10s x 10 :05  10x each  5/10s x 10 :10  10x each             ltr / ppt 5/3s x 10/20  5/3s x 10/20 :10/:05  10x/20x  5/3' x 10/20 :05  20x each             hss 20s x 5  20s x 5 :20  5x  20s x 5 :20  5x             Treadmill or recumbent bike    bike x 10' Upright Bike  10'  bike 10' Bike 10'   L4              partial sit ups   15 x 2 15x2  15 x 2 15x2              bridging    3s x 30 :03  30x  3s x30 RTB hip abd  30x             Supine dls a/l   G00 ea 89U each  20x 20x             Piriformis stretch   20s x 3 :20  3x  20s x 3 :20  5x                                                                                     Prop on elbows        2' 3'             Press ups        10x2 10x3                                      squats        x20 20x                                                                                                                                                                                                                   Modalities  5/10  5/14 5/16  5/ 21            p  LS seated 10'  10' 10'  10'                                                                  Assessment: Tolerated treatment without reports of exacerbation of pain symptoms or limitations in exercise performance    Patient exhibited good technique with therapeutic exercises      Plan: Continue per plan of care  Progress plan to tolerance

## 2018-05-31 ENCOUNTER — OFFICE VISIT (OUTPATIENT)
Dept: PHYSICAL THERAPY | Facility: CLINIC | Age: 58
End: 2018-05-31
Payer: COMMERCIAL

## 2018-05-31 DIAGNOSIS — M62.838 SPASM OF RIGHT PIRIFORMIS MUSCLE: ICD-10-CM

## 2018-05-31 DIAGNOSIS — M62.9 HAMSTRING TIGHTNESS OF BOTH LOWER EXTREMITIES: ICD-10-CM

## 2018-05-31 DIAGNOSIS — M62.830 LUMBAR PARASPINAL MUSCLE SPASM: ICD-10-CM

## 2018-05-31 DIAGNOSIS — M51.26 BULGE OF LUMBAR DISC WITHOUT MYELOPATHY: ICD-10-CM

## 2018-05-31 DIAGNOSIS — M48.061 NEUROFORAMINAL STENOSIS OF LUMBAR SPINE: ICD-10-CM

## 2018-05-31 DIAGNOSIS — M51.26 LUMBAR DISC HERNIATION: Primary | ICD-10-CM

## 2018-05-31 PROCEDURE — 97110 THERAPEUTIC EXERCISES: CPT

## 2018-05-31 PROCEDURE — 97140 MANUAL THERAPY 1/> REGIONS: CPT

## 2018-05-31 NOTE — PROGRESS NOTES
Daily Note     Today's date: 2018  Patient name: Nicole Raman  : 1960  MRN: [id number]  Referring provider: Marcel Mike DO  Dx:   Encounter Diagnosis     ICD-10-CM    1  Lumbar disc herniation M51 26    2  Neuroforaminal stenosis of lumbar spine M99 83    3  Bulge of lumbar disc without myelopathy M51 26    4  Spasm of right piriformis muscle M62 838    5  Lumbar paraspinal muscle spasm M62 830    6  Hamstring tightness of both lower extremities M62 9                   Subjective: SPR =0/10  Patient reports muscular "soreness from exercising"        Objective: See treatment diary below  Manual                    Manual hss/quad s db LA PTA  LA PTA                                                                                                                       Exercise Diary  5/10 5/14 5/16  5/21 5/30 5/31           aktc/ dktc 5/10s x 10  5/10s x 10 :05  10x each  5/10s x 10 :10  10x each :10  10x each           ltr / ppt 5/3s x 10/20  5/3s x 10/20 :10/:05  10x/20x  5/3' x 10/20 :05  20x each :05  20x each           hss 20s x 5  20s x 5 :20  5x  20s x 5 :20  5x :20  5x each           Treadmill or recumbent bike    bike x 10' Upright Bike  10'  bike 10' Bike 10'   L4 Bike  10'  L8 (upright)            partial sit ups   15 x 2 15x2  15 x 2 15x2 15x2            bridging    3s x 30 :03  30x  3s x30 RTB hip abd  30x GTB  Hip abd  30x           Supine dls a/l   Q97 ea 80J each  20x 20x 20x           Piriformis stretch   20s x 3 :20  3x  20s x 3 :20  5x :20  5x                                                                                   Prop on elbows        2' 3' 3'           Press ups        10x2 10x3 10x3           Prone alt hip extension           :05  20x each            squats        x20 20x 15x2                                                                                                                                                                                                                 Modalities  5/10  5/14 5/16  5/ 21               mhp  LS seated 10'  10' 10'  10'                                                                     Assessment: Tolerated treatment without limitations in exercise performance    Patient exhibited good technique with therapeutic exercises  Patient noted daily compliance with most of his HEP to tolerance  Plan: Continue per plan of care  Progress program to tolerance

## 2018-06-04 ENCOUNTER — OFFICE VISIT (OUTPATIENT)
Dept: PHYSICAL THERAPY | Facility: CLINIC | Age: 58
End: 2018-06-04
Payer: COMMERCIAL

## 2018-06-04 DIAGNOSIS — M51.26 BULGE OF LUMBAR DISC WITHOUT MYELOPATHY: ICD-10-CM

## 2018-06-04 DIAGNOSIS — M62.838 SPASM OF RIGHT PIRIFORMIS MUSCLE: ICD-10-CM

## 2018-06-04 DIAGNOSIS — M62.9 HAMSTRING TIGHTNESS OF BOTH LOWER EXTREMITIES: ICD-10-CM

## 2018-06-04 DIAGNOSIS — M51.26 LUMBAR DISC HERNIATION: Primary | ICD-10-CM

## 2018-06-04 DIAGNOSIS — M48.061 NEUROFORAMINAL STENOSIS OF LUMBAR SPINE: ICD-10-CM

## 2018-06-04 DIAGNOSIS — M62.830 LUMBAR PARASPINAL MUSCLE SPASM: ICD-10-CM

## 2018-06-04 PROCEDURE — 97110 THERAPEUTIC EXERCISES: CPT | Performed by: PHYSICAL THERAPIST

## 2018-06-04 NOTE — PROGRESS NOTES
Daily Note     Today's date: 2018  Patient name: Delicia Arvizu  : 1960  MRN: [id number]  Referring provider: Evelia Phipps DO  Dx:   Encounter Diagnosis     ICD-10-CM    1  Lumbar disc herniation M51 26    2  Neuroforaminal stenosis of lumbar spine M99 83    3  Bulge of lumbar disc without myelopathy M51 26    4  Spasm of right piriformis muscle M62 838    5  Lumbar paraspinal muscle spasm M62 830    6  Hamstring tightness of both lower extremities M62 9                   Subjective: Patient reports that he was many hours on his back under the car replacing his alternator on Saturday - his back held up "pretty good', but his body is overall sore today still from this           Objective: See treatment diary below  Manual                  Manual hss/quad s db LA PTA  LA PTA  db                                                                                                                     Exercise Diary  5/10 5/14 5/16  5/21 5/30 5/31  6         aktc/ dktc 5/10s x 10  5/10s x 10 :05  10x each  5/10s x 10 :10  10x each :10  10x each  5/10s x 20         ltr / ppt 5/3s x 10/20  5/3s x 10/20 :10/:05  10x/20x  5/3' x 10/20 :05  20x each :05  20x each  5/3 s x 20         hss 20s x 5  20s x 5 :20  5x  20s x 5 :20  5x :20  5x each  20s x 5         Treadmill or recumbent bike    bike x 10' Upright Bike  10'  bike 10' Bike 10'   L4 Bike  10'  L8 (upright)  10' bike          partial sit ups   15 x 2 15x2  15 x 2 15x2 15x2  15 x 2          bridging    3s x 30 :03  30x  3s x30 RTB hip abd  30x GTB  Hip abd  30x  gtb x 30         Supine dls a/l   Q24 ea 40W each  20x 20x 20x  20x          Piriformis stretch   20s x 3 :20  3x  20s x 3 :20  5x :20  5x 20s x 5                                                                                  Prop on elbows        2' 3' 3' 3'         Press ups        10x2 10x3 10x3  10x 3         Prone alt hip extension           :05  20x each  20x           squats        x20 20x 15x2  15x2                                  total gym              L10 x 30                                                                                                                                                               Modalities  5/10  5/14 5/16  5/ 21               mhp  LS seated 10'  10' 10'  10'                                                                     Assessment: Tolerated treatment well despite feeling sore  Patient able to progress pres each session without issue  Plan: Continue per plan of care  Progress program to tolerance

## 2018-06-06 ENCOUNTER — OFFICE VISIT (OUTPATIENT)
Dept: PHYSICAL THERAPY | Facility: CLINIC | Age: 58
End: 2018-06-06
Payer: COMMERCIAL

## 2018-06-06 DIAGNOSIS — M62.830 LUMBAR PARASPINAL MUSCLE SPASM: ICD-10-CM

## 2018-06-06 DIAGNOSIS — M51.26 LUMBAR DISC HERNIATION: Primary | ICD-10-CM

## 2018-06-06 DIAGNOSIS — M62.838 SPASM OF RIGHT PIRIFORMIS MUSCLE: ICD-10-CM

## 2018-06-06 DIAGNOSIS — M51.26 BULGE OF LUMBAR DISC WITHOUT MYELOPATHY: ICD-10-CM

## 2018-06-06 DIAGNOSIS — M62.9 HAMSTRING TIGHTNESS OF BOTH LOWER EXTREMITIES: ICD-10-CM

## 2018-06-06 DIAGNOSIS — M48.061 NEUROFORAMINAL STENOSIS OF LUMBAR SPINE: ICD-10-CM

## 2018-06-06 PROCEDURE — G8990 OTHER PT/OT CURRENT STATUS: HCPCS

## 2018-06-06 PROCEDURE — 97110 THERAPEUTIC EXERCISES: CPT

## 2018-06-06 PROCEDURE — G8991 OTHER PT/OT GOAL STATUS: HCPCS

## 2018-06-06 NOTE — PROGRESS NOTES
Daily Note     Today's date: 2018  Patient name: Alena Gilliam  : 1960  MRN: [id number]  Referring provider: Rashid Shore DO  Dx:   Encounter Diagnosis     ICD-10-CM    1  Lumbar disc herniation M51 26    2  Neuroforaminal stenosis of lumbar spine M99 83    3  Bulge of lumbar disc without myelopathy M51 26    4  Spasm of right piriformis muscle M62 838    5  Lumbar paraspinal muscle spasm M62 830    6  Hamstring tightness of both lower extremities M62 9                   Subjective: Patient denied any pain and stated he will have to watch his granddaughter over the next few weeks and is unable to attend therapy  Patient requested to discharge himself from therapy services              Objective: See treatment diary below    Manual                  Manual hss/quad s db LA PTA  LA PTA  db                                                                                                                     Exercise Diary  5/10 5/14 5/16  5/21 5/30 5/31  6/4 6       aktc/ dktc 5/10s x 10  5/10s x 10 :05  10x each  5/10s x 10 :10  10x each :10  10x each  5/10s x 20 :10  20x       ltr / ppt 5/3s x 10/20  5/3s x 10/20 :10/:05  10x/20x  5/3' x 10/20 :05  20x each :05  20x each  5/3 s x 20 :05  20x       hss 20s x 5  20s x 5 :20  5x  20s x 5 :20  5x :20  5x each  20s x 5 :20  5x       Treadmill or recumbent bike    bike x 10' Upright Bike  10'  bike 10' Bike 10'   L4 Bike  10'  L8 (upright)  10' bike 10'  bike        partial sit ups   15 x 2 15x2  15 x 2 15x2 15x2  15 x 2 30x        bridging    3s x 30 :03  30x  3s x30 RTB hip abd  30x GTB  Hip abd  30x  gtb x 30 GTB  30x       Supine dls a/l   A41 ea 82I each  20x 20x 20x  20x  25x       Piriformis stretch   20s x 3 :20  3x  20s x 3 :20  5x :20  5x 20s x 5  :20  5x                                                                               Prop on elbows        2' 3' 3' 3' 3'       Press ups        10x2 10x3 10x3  10x 3 10x3       Prone alt hip extension           :05  20x each  20x  20x       squats        x20 20x 15x2  15x2 30x  On foam                                total gym              L10 x 30 L10  30x                                                                                                                                                             Modalities  5/10  5/14 5/16  5/ 21              mhp  LS seated 10'  10' 10'  10'                                                                       Assessment: Tolerated treatment without exacerbation of pain symptoms or limitations in exercise performance    Patient exhibited good technique with therapeutic exercises      Plan: Discharge patient from physical therapy serivces this date as per patient request   Refer todischarge summary for details of progress

## 2018-06-06 NOTE — PROGRESS NOTES
PT Discharge    Today's date: 2018  Patient name: Loli Branham  : 1960  MRN: [id number]  Referring provider: Norberto Blanco DO  Dx:   Encounter Diagnosis     ICD-10-CM    1  Lumbar disc herniation M51 26    2  Neuroforaminal stenosis of lumbar spine M99 83    3  Bulge of lumbar disc without myelopathy M51 26    4  Spasm of right piriformis muscle M62 838    5  Lumbar paraspinal muscle spasm M62 830    6  Hamstring tightness of both lower extremities M62 9        Start Time: 945  Stop Time: 1030  Total time in clinic (min): 45 minutes    Assessment    Assessment details: Patient has done nicely in PT - he reports that he feels 100% better since coming to PT and would like to make today his last day  He notes that he feels competent with HEP and understands the importance of continuing with such upon discharge today  Understanding of Dx/Px/POC: good   Prognosis: good    Goals  STG  1  Decrease pain @ worst by at least two subjective ratings in 4 weeks  MET  2  Increase Trunk AROM to [employer] all planes 4 weeks   MET  3  Patient will report centralizing LE radicular symptoms  4 weeks   MET  LTG  1  Independent with HEP  2  Abolish LE radicular symptoms  4-6 weeks   MET  3  Patient will tolerate all positions prn w/o exacerbation of pain  6- weeks  MET  4  Improved postural awareness and self correction  4-6 weeks     MET      Plan  Plan details: Patient is independent and competent with HEP and will be discharged to such today-  Original goals achieved    Thank you for this referral         Subjective Evaluation    History of Present Illness  Mechanism of injury:     Quality of life: fair    Pain  Current pain ratin  At best pain ratin  At worst pain ratin  Progression: improved    Social Support  Stairs in house: yes   Lives in: multiple-level home  Lives with: spouse    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)  Hand dominance: right      Diagnostic Tests  CT scan: abnormal  Treatments  Previous treatment: medication  Patient Goals  Patient goals for therapy: decreased pain and independence with ADLs/IADLs          Objective     Special Questions  Negative for disturbed sleep, bladder dysfunction, bowel dysfunction and saddle (S4) numbness    Postural Observations  Seated posture: fair  Standing posture: fair  Correction of posture: has no consistent effect    Additional Postural Observation Details       Tenderness     Additional Tenderness Details  unremarkable    Neurological Testing     Sensation     Lumbar   Left   Intact: light touch    Right   Intact: light touch    Active Range of Motion     Lumbar   Flexion: WFL  Extension: WFL  Left lateral flexion: WFL  Right lateral flexion: [facility]    Additional Active Range of Motion Details  Full, painfree trunk ROM demonstrated today  Strength/Myotome Testing     Lumbar   Left   Normal strength    Right   Normal strength    Tests       Thoracic   Negative slump  Lumbar   Negative slump  Left   Negative passive SLR  Right   Negative passive SLR  Additional Tests Details  SLR  R = 75 degrees              L = 75 degrees      RFIS - NE  RFIL - better  CIARA - NE  CIARA - NE  Prone on elbows - NE        General Comments     Lumbar Comments  GAIT - normal heel - toe progression now  Transitional movement - unremarkable now  Flowsheet Rows      Most Recent Value   PT/OT G-Codes   Current Score  83   Projected Score  63   FOTO information reviewed  Yes   Assessment Type  Re-evaluation [FOTO only]   G code set  Other PT/OT Primary   Other PT Primary Current Status ()  CI   Other PT Primary Goal Status ()  CJ          Precautions: LB tightness reported with intermittent R > L radicular symptoms lateral hip/ lateral calf region

## 2020-06-24 ENCOUNTER — TRANSCRIBE ORDERS (OUTPATIENT)
Dept: ADMINISTRATIVE | Facility: HOSPITAL | Age: 60
End: 2020-06-24

## 2020-06-24 DIAGNOSIS — Z53.8 PROCEDURE NOT CARRIED OUT FOR OTHER REASONS: ICD-10-CM

## 2020-06-24 DIAGNOSIS — Z80.0 FAMILY HISTORY OF COLON CANCER: ICD-10-CM

## 2020-06-24 DIAGNOSIS — Z80.0 FAMILY HX OF COLON CANCER: ICD-10-CM

## 2020-06-24 DIAGNOSIS — Z12.11 SPECIAL SCREENING FOR MALIGNANT NEOPLASMS, COLON: Primary | ICD-10-CM

## 2020-07-09 ENCOUNTER — APPOINTMENT (OUTPATIENT)
Dept: LAB | Facility: HOSPITAL | Age: 60
End: 2020-07-09
Attending: COLON & RECTAL SURGERY
Payer: COMMERCIAL

## 2020-07-09 DIAGNOSIS — Z12.11 SPECIAL SCREENING FOR MALIGNANT NEOPLASMS, COLON: ICD-10-CM

## 2020-07-09 DIAGNOSIS — Z53.8 PROCEDURE NOT CARRIED OUT FOR OTHER REASONS: ICD-10-CM

## 2020-07-09 DIAGNOSIS — Z80.0 FAMILY HX OF COLON CANCER: ICD-10-CM

## 2020-07-09 LAB
BUN SERPL-MCNC: 17 MG/DL (ref 5–25)
CREAT UR-MCNC: 221 MG/DL
CREAT UR-MCNC: 221 MG/DL
MICROALBUMIN UR-MCNC: 9.4 MG/L (ref 0–20)
MICROALBUMIN/CREAT 24H UR: 4 MG/G CREATININE (ref 0–30)

## 2020-07-09 PROCEDURE — 84520 ASSAY OF UREA NITROGEN: CPT

## 2020-07-09 PROCEDURE — 82570 ASSAY OF URINE CREATININE: CPT | Performed by: COLON & RECTAL SURGERY

## 2020-07-09 PROCEDURE — 36415 COLL VENOUS BLD VENIPUNCTURE: CPT

## 2020-07-09 PROCEDURE — 82043 UR ALBUMIN QUANTITATIVE: CPT | Performed by: COLON & RECTAL SURGERY

## 2020-11-09 ENCOUNTER — TELEPHONE (OUTPATIENT)
Dept: UROLOGY | Facility: CLINIC | Age: 60
End: 2020-11-09

## 2021-03-10 DIAGNOSIS — Z23 ENCOUNTER FOR IMMUNIZATION: ICD-10-CM

## 2021-04-06 ENCOUNTER — OFFICE VISIT (OUTPATIENT)
Dept: UROLOGY | Facility: CLINIC | Age: 61
End: 2021-04-06
Payer: COMMERCIAL

## 2021-04-06 VITALS
SYSTOLIC BLOOD PRESSURE: 130 MMHG | HEIGHT: 70 IN | BODY MASS INDEX: 31.07 KG/M2 | DIASTOLIC BLOOD PRESSURE: 88 MMHG | WEIGHT: 217 LBS

## 2021-04-06 DIAGNOSIS — Z12.5 SCREENING FOR PROSTATE CANCER: ICD-10-CM

## 2021-04-06 DIAGNOSIS — N52.9 ERECTILE DYSFUNCTION, UNSPECIFIED ERECTILE DYSFUNCTION TYPE: Primary | ICD-10-CM

## 2021-04-06 PROCEDURE — 99203 OFFICE O/P NEW LOW 30 MIN: CPT | Performed by: PHYSICIAN ASSISTANT

## 2021-04-06 RX ORDER — TADALAFIL 5 MG/1
5 TABLET ORAL DAILY PRN
Qty: 90 TABLET | Refills: 1 | Status: SHIPPED | OUTPATIENT
Start: 2021-04-06

## 2021-04-06 NOTE — PROGRESS NOTES
4/6/2021      Chief Complaint   Patient presents with    Erectile Dysfunction     Patient was not able to give urine sample today  Assessment and Plan  1  Erectile Dysfunction  - Patient tried low dose Viagra in the past without benefit  - Additionally having urinary symptoms  Discussed with patient the benefits of Cialis 5 mg on a daily basis for both ED and LUTS  Instructed patient to take on an empty stomach  Prescription sent over to pharmacy  - Follow up in 3 months for symptom reassessment  - All questions answered; patient understands and agrees with plan    2  Lower Urinary Tract Symptoms  - Frequency, urgency; will try daily Cialis 5 mg for LUTS as well as ED  - Patient understands and agrees with plan    3  Routine prostate cancer screening  - PSA in February 2020 was 0 51  - Patient never had DARRYL in past  - DARRYL today demonstrated smooth, non-tender prostate approximately 25 g with no nodules  - Possible family history of prostate cancer (father)  Patient will follow up with definite family history  - Repeat PSA before follow up in 3 months       History of Present Illness  Kristi Zaman is a 64 y o  male new patient here for evaluation of Erectile Dysfunction and lower urinary tract symptoms  Patient has had off and on erectile dysfunction for many years, however, for the past 5 years has had minimal ability to achieve erection  Patient has tried low dose Viagra without benefit  Patient also has had frequency, urgency, and nocturia x 1  Patient is a  for the past 20 years and works extensive hours; 6 days a week, 12 hours a day  Patient denies dysuria and gross hematuria  Patient unsure of family history of prostate cancer  Patient's father diagnosed with cancer within the past few years and patient is unsure if this was prostate cancer or another cancer  He will follow up with his father to determine the type  Patient denies family history of other  malignancies       Patient had PSA in 2/2020 which was 0 51  Patient denies ever having DARRYL  Review of Systems   Constitutional: Negative for activity change, appetite change, chills and fever  HENT: Negative for congestion and trouble swallowing  Respiratory: Negative for cough and shortness of breath  Cardiovascular: Negative for chest pain, palpitations and leg swelling  Gastrointestinal: Negative for abdominal pain, constipation, diarrhea, nausea and vomiting  Genitourinary: Positive for frequency and urgency  Negative for difficulty urinating, dysuria, flank pain and hematuria  Erectile Dysfunction   Musculoskeletal: Negative for back pain and gait problem  Skin: Negative for wound  Allergic/Immunologic: Negative for immunocompromised state  Neurological: Negative for dizziness and syncope  Hematological: Does not bruise/bleed easily  Psychiatric/Behavioral: Negative for confusion  All other systems reviewed and are negative  Vitals  Vitals:    04/06/21 0828   BP: 130/88   BP Location: Left arm   Patient Position: Sitting   Cuff Size: Adult   Weight: 98 4 kg (217 lb)   Height: 5' 10" (1 778 m)       Physical Exam  Constitutional:       General: He is not in acute distress  Appearance: He is not ill-appearing  HENT:      Head: Normocephalic  Neck:      Musculoskeletal: Normal range of motion  Pulmonary:      Effort: Pulmonary effort is normal    Abdominal:      Palpations: Abdomen is soft  Tenderness: There is no abdominal tenderness  Genitourinary:     Comments: Uncircumcised, foreskin easily retracted  Urethral meatus patent  Bilateral descended testes    Prostate smooth, non-tender, approximately 25 g, with no nodules   Skin:     General: Skin is warm and dry  Neurological:      Mental Status: He is alert and oriented to person, place, and time     Psychiatric:         Mood and Affect: Mood normal          Behavior: Behavior normal            Past History  Past Medical History:   Diagnosis Date    Asthma     Back pain      Social History     Socioeconomic History    Marital status: Significant Other     Spouse name: None    Number of children: None    Years of education: None    Highest education level: None   Occupational History    None   Social Needs    Financial resource strain: None    Food insecurity     Worry: None     Inability: None    Transportation needs     Medical: None     Non-medical: None   Tobacco Use    Smoking status: Never Smoker    Smokeless tobacco: Never Used   Substance and Sexual Activity    Alcohol use: Yes     Comment: occasionally    Drug use: No    Sexual activity: None   Lifestyle    Physical activity     Days per week: None     Minutes per session: None    Stress: None   Relationships    Social connections     Talks on phone: None     Gets together: None     Attends Je[employer]'s witness service: None     Active member of club or organization: None     Attends meetings of clubs or organizations: None     Relationship status: None    Intimate partner violence     Fear of current or ex partner: None     Emotionally abused: None     Physically abused: None     Forced sexual activity: None   Other Topics Concern    None   Social History Narrative    None     Social History     Tobacco Use   Smoking Status Never Smoker   Smokeless Tobacco Never Used     Family History   Problem Relation Age of Onset    Hypertension Mother     Hypertension Father        The following portions of the patient's history were reviewed and updated as appropriate: allergies, current medications, past medical history, past social history, past surgical history and problem list     Results  No results found for this or any previous visit (from the past 1 hour(s))  ]  No results found for: PSA  Lab Results   Component Value Date    BUN 17 07/09/2020     No results found for: WBC, HGB, HCT, MCV, PLT    Tanda KARLA Berry

## 2021-04-21 ENCOUNTER — OFFICE VISIT (OUTPATIENT)
Dept: GASTROENTEROLOGY | Facility: CLINIC | Age: 61
End: 2021-04-21
Payer: COMMERCIAL

## 2021-04-21 VITALS
HEART RATE: 76 BPM | WEIGHT: 220.4 LBS | BODY MASS INDEX: 31.55 KG/M2 | SYSTOLIC BLOOD PRESSURE: 142 MMHG | HEIGHT: 70 IN | DIASTOLIC BLOOD PRESSURE: 84 MMHG

## 2021-04-21 DIAGNOSIS — K59.00 CONSTIPATION, UNSPECIFIED CONSTIPATION TYPE: ICD-10-CM

## 2021-04-21 DIAGNOSIS — R14.0 BLOATING: Primary | ICD-10-CM

## 2021-04-21 PROCEDURE — 99203 OFFICE O/P NEW LOW 30 MIN: CPT | Performed by: PHYSICIAN ASSISTANT

## 2021-07-07 ENCOUNTER — OFFICE VISIT (OUTPATIENT)
Dept: UROLOGY | Facility: CLINIC | Age: 61
End: 2021-07-07
Payer: COMMERCIAL

## 2021-07-07 VITALS
WEIGHT: 214.1 LBS | SYSTOLIC BLOOD PRESSURE: 110 MMHG | HEIGHT: 70 IN | DIASTOLIC BLOOD PRESSURE: 88 MMHG | HEART RATE: 60 BPM | BODY MASS INDEX: 30.65 KG/M2

## 2021-07-07 DIAGNOSIS — N52.9 ERECTILE DYSFUNCTION, UNSPECIFIED ERECTILE DYSFUNCTION TYPE: ICD-10-CM

## 2021-07-07 DIAGNOSIS — Z12.5 SCREENING FOR PROSTATE CANCER: ICD-10-CM

## 2021-07-07 DIAGNOSIS — R35.1 NOCTURIA: Primary | ICD-10-CM

## 2021-07-07 LAB — POST-VOID RESIDUAL VOLUME, ML POC: 27 ML

## 2021-07-07 PROCEDURE — 99213 OFFICE O/P EST LOW 20 MIN: CPT | Performed by: PHYSICIAN ASSISTANT

## 2021-07-07 PROCEDURE — 51798 US URINE CAPACITY MEASURE: CPT | Performed by: PHYSICIAN ASSISTANT

## 2021-07-07 NOTE — PROGRESS NOTES
7/7/2021      Chief Complaint   Patient presents with    Erectile Dysfunction     Assessment and Plan  1  Erectile Dysfunction  - increase to Cialis 20mg PRN  - if no improvement the ICI    2  Lower Urinary Tract Symptoms  - not improvement with Cialis 5mg daily  - mild LUTS and demonstrating adequate bladder emptying  Reviewed should these of conservative measures versus trial tamsulosin 0 4 mg daily  We will continue with conservative measures at this time given overall mild symptoms  3  Routine prostate cancer screening  - normal DARRYL 4/2021  - Patient is due for his updated PSA  He will go for this within the next few  History of Present Illness  Namita Wilson is a 64 y o  male presenting for follow up of erectile Dysfunction and lower urinary tract symptoms  Patient has had off and on erectile dysfunction for many years, however, for the past 5 years has had minimal ability to achieve erection  Patient has tried low dose Viagra without benefit  Patient also has had frequency, urgency, and nocturia x 1  Patient is a  for the past 20 years and works extensive hours; 6 days a week, 12 hours a day  Patient denies dysuria and gross hematuria  At last visit started on Cialis 5mg daily for LUTS and ED  Did not notice any symptom improvement in either LUT or ED  Only achieving partial rigidity 20-30%, insufficient for penetration or maintenance  Patient unsure of family history of prostate cancer  Patient's father diagnosed with cancer within the past few years and patient is unsure if this was prostate cancer or another cancer  He will follow up with his father to determine the type  Patient denies family history of other  malignancies  Patient had PSA in 2/2020 which was 0 51  DARRYL 4/2021 revealed a 25g gland without abnormality  Patient was instructed to get a PSA prior to his visit however unfortunately this was not performed         PVR 27mL        Review of Systems Constitutional: Negative for activity change, appetite change, chills and fever  HENT: Negative for congestion and trouble swallowing  Respiratory: Negative for cough and shortness of breath  Cardiovascular: Negative for chest pain, palpitations and leg swelling  Gastrointestinal: Negative for abdominal pain, constipation, diarrhea, nausea and vomiting  Genitourinary: Positive for frequency and urgency  Negative for difficulty urinating, dysuria, flank pain and hematuria  Erectile Dysfunction   Musculoskeletal: Negative for back pain and gait problem  Skin: Negative for wound  Allergic/Immunologic: Negative for immunocompromised state  Neurological: Negative for dizziness and syncope  Hematological: Does not bruise/bleed easily  Psychiatric/Behavioral: Negative for confusion  All other systems reviewed and are negative  Vitals  Vitals:    07/07/21 0750   BP: 110/88   Pulse: 60   Weight: 97 1 kg (214 lb 1 6 oz)   Height: 5' 10" (1 778 m)       Physical Exam  Constitutional:       General: He is not in acute distress  Appearance: He is not ill-appearing  HENT:      Head: Normocephalic  Pulmonary:      Effort: Pulmonary effort is normal    Abdominal:      Palpations: Abdomen is soft  Tenderness: There is no abdominal tenderness  Musculoskeletal:      Cervical back: Normal range of motion  Skin:     General: Skin is warm and dry  Neurological:      Mental Status: He is alert and oriented to person, place, and time     Psychiatric:         Mood and Affect: Mood normal          Behavior: Behavior normal            Past History  Past Medical History:   Diagnosis Date    Asthma     Back pain      Social History     Socioeconomic History    Marital status: Significant Other     Spouse name: None    Number of children: None    Years of education: None    Highest education level: None   Occupational History    None   Tobacco Use    Smoking status: Never Smoker    Smokeless tobacco: Never Used   Vaping Use    Vaping Use: Never used   Substance and Sexual Activity    Alcohol use: Yes     Comment: occasionally    Drug use: No    Sexual activity: None   Other Topics Concern    None   Social History Narrative    None     Social Determinants of Health     Financial Resource Strain:     Difficulty of Paying Living Expenses:    Food Insecurity:     Worried About Running Out of Food in the Last Year:     Ran Out of Food in the Last Year:    Transportation Needs:     Lack of Transportation (Medical):      Lack of Transportation (Non-Medical):    Physical Activity:     Days of Exercise per Week:     Minutes of Exercise per Session:    Stress:     Feeling of Stress :    Social Connections:     Frequency of Communication with Friends and Family:     Frequency of Social Gatherings with Friends and Family:     Attends [employer] Services:     Active Member of Clubs or Organizations:     Attends Club or Organization Meetings:     Marital Status:    Intimate Partner Violence:     Fear of Current or Ex-Partner:     Emotionally Abused:     Physically Abused:     Sexually Abused:      Social History     Tobacco Use   Smoking Status Never Smoker   Smokeless Tobacco Never Used     Family History   Problem Relation Age of Onset    Hypertension Mother     Hypertension Father        The following portions of the patient's history were reviewed and updated as appropriate: allergies, current medications, past medical history, past social history, past surgical history and problem list     Results  Recent Results (from the past 1 hour(s))   POCT Measure PVR    Collection Time: 07/07/21  7:55 AM   Result Value Ref Range    POST-VOID RESIDUAL VOLUME, ML POC 27 mL   ]  No results found for: PSA  Lab Results   Component Value Date    BUN 17 07/09/2020     No results found for: WBC, HGB, HCT, MCV, PLT    Laila Sheehan PA-C

## 2022-04-11 ENCOUNTER — HOSPITAL ENCOUNTER (OUTPATIENT)
Dept: CT IMAGING | Facility: HOSPITAL | Age: 62
Discharge: HOME | End: 2022-04-11
Attending: COLON & RECTAL SURGERY
Payer: COMMERCIAL

## 2022-04-11 DIAGNOSIS — R10.32 ABDOMINAL PAIN, LEFT LOWER QUADRANT: ICD-10-CM

## 2022-04-11 PROCEDURE — G1004 CDSM NDSC: HCPCS

## 2022-04-11 PROCEDURE — 74261 CT COLONOGRAPHY DX: CPT

## 2024-04-14 ENCOUNTER — OCCMED (OUTPATIENT)
Age: 64
End: 2024-04-14

## 2024-04-14 DIAGNOSIS — Z02.89 ENCOUNTER FOR EXAMINATION REQUIRED BY DEPARTMENT OF TRANSPORTATION (DOT): Primary | ICD-10-CM

## 2025-05-09 ENCOUNTER — TELEPHONE (OUTPATIENT)
Age: 65
End: 2025-05-09

## 2025-05-19 ENCOUNTER — PREP FOR PROCEDURE (OUTPATIENT)
Age: 65
End: 2025-05-19

## 2025-05-19 ENCOUNTER — TELEPHONE (OUTPATIENT)
Age: 65
End: 2025-05-19

## 2025-05-19 DIAGNOSIS — Z80.0 FAMILY HX OF COLON CANCER: Primary | ICD-10-CM

## 2025-05-19 NOTE — TELEPHONE ENCOUNTER
Scheduled date of colonoscopy (as of today):7/7/25  Physician performing colonoscopy:   Location of colonoscopy: [facility]  Bowel prep reviewed with patient: Dixie Avila  Instructions reviewed with patient by: Dixie Avila  Clearances: N/A        Herb Dul prep sent via [employer]

## 2025-05-19 NOTE — TELEPHONE ENCOUNTER
05/19/25  Screened by: Dixie Avila    Referring Provider     Pre- Screening:     There is no height or weight on file to calculate BMI.  Has patient been referred for a routine screening Colonoscopy? yes  Is the patient between 45-75 years old? yes      Previous Colonoscopy yes   If yes:    Date: 6/8/2020    Facility: [facility]    Reason: FM HX of colon cancer          Does the patient want to see a Gastroenterologist prior to their procedure OR are they having any GI symptoms? no    Has the patient been hospitalized or had abdominal surgery in the past 6 months? no    Does the patient use supplemental oxygen? no    Does the patient take Coumadin, Lovenox, Plavix, Elliquis, Xarelto, or other blood thinning medication? no    Has the patient had a stroke, cardiac event, or stent placed in the past year? no        If patient is between 45yrs - 49yrs, please advise patient that we will have to confirm benefits & coverage with their insurance company for a routine screening colonoscopy.

## 2025-06-23 ENCOUNTER — TELEPHONE (OUTPATIENT)
Age: 65
End: 2025-06-23

## 2025-06-23 NOTE — TELEPHONE ENCOUNTER
Patient called back to jackson procedure. Stating he would like it done asap.  Scheduled for: Monday 7/7/2025   Dr. Sana BOLIVAR RM3  Prep instructions sent via e-mail (address on file)

## 2025-06-23 NOTE — LETTER
Attached are your prep instructions for your upcoming procedure on 7/7/25 If you have any questions or concerns please contact us at [phone].    Thank you,     [facility]

## 2025-06-23 NOTE — TELEPHONE ENCOUNTER
smitha for ptn to call back and r/s their colonoscopy with Dr. antonio due to him being out for surgery. Ptn was notified they can schedule in Nov with with Bruno of sooner with Luan or Claus

## 2025-07-07 ENCOUNTER — HOSPITAL ENCOUNTER (OUTPATIENT)
Dept: GASTROENTEROLOGY | Facility: HOSPITAL | Age: 65
Setting detail: OUTPATIENT SURGERY
Discharge: HOME | End: 2025-07-07
Attending: COLON & RECTAL SURGERY
Payer: COMMERCIAL

## 2025-07-07 ENCOUNTER — ANESTHESIA EVENT (OUTPATIENT)
Dept: GASTROENTEROLOGY | Facility: HOSPITAL | Age: 65
End: 2025-07-07
Payer: COMMERCIAL

## 2025-07-07 ENCOUNTER — ANESTHESIA (OUTPATIENT)
Dept: GASTROENTEROLOGY | Facility: HOSPITAL | Age: 65
End: 2025-07-07
Payer: COMMERCIAL

## 2025-07-07 VITALS
HEIGHT: 70 IN | HEART RATE: 76 BPM | BODY MASS INDEX: 28.12 KG/M2 | DIASTOLIC BLOOD PRESSURE: 81 MMHG | WEIGHT: 196.43 LBS | RESPIRATION RATE: 20 BRPM | SYSTOLIC BLOOD PRESSURE: 140 MMHG | TEMPERATURE: 97.6 F | OXYGEN SATURATION: 97 %

## 2025-07-07 DIAGNOSIS — Z80.0 FAMILY HX OF COLON CANCER: ICD-10-CM

## 2025-07-07 PROBLEM — E11.9 DIABETES MELLITUS, TYPE 2 (HCC): Status: ACTIVE | Noted: 2025-07-07

## 2025-07-07 PROBLEM — J45.909 ASTHMA: Status: ACTIVE | Noted: 2025-07-07

## 2025-07-07 PROBLEM — I10 HTN (HYPERTENSION): Status: ACTIVE | Noted: 2025-07-07

## 2025-07-07 LAB — GLUCOSE SERPL-MCNC: 109 MG/DL (ref 65–140)

## 2025-07-07 PROCEDURE — 88305 TISSUE EXAM BY PATHOLOGIST: CPT | Performed by: PATHOLOGY

## 2025-07-07 PROCEDURE — 82948 REAGENT STRIP/BLOOD GLUCOSE: CPT

## 2025-07-07 PROCEDURE — 45385 COLONOSCOPY W/LESION REMOVAL: CPT | Performed by: INTERNAL MEDICINE

## 2025-07-07 RX ORDER — PHENYLEPHRINE HCL IN 0.9% NACL 1 MG/10 ML
SYRINGE (ML) INTRAVENOUS AS NEEDED
Status: DISCONTINUED | OUTPATIENT
Start: 2025-07-07 | End: 2025-07-07

## 2025-07-07 RX ORDER — SODIUM CHLORIDE, SODIUM LACTATE, POTASSIUM CHLORIDE, CALCIUM CHLORIDE 600; 310; 30; 20 MG/100ML; MG/100ML; MG/100ML; MG/100ML
INJECTION, SOLUTION INTRAVENOUS CONTINUOUS PRN
Status: DISCONTINUED | OUTPATIENT
Start: 2025-07-07 | End: 2025-07-07

## 2025-07-07 RX ORDER — LIDOCAINE HYDROCHLORIDE 20 MG/ML
INJECTION, SOLUTION EPIDURAL; INFILTRATION; INTRACAUDAL; PERINEURAL AS NEEDED
Status: DISCONTINUED | OUTPATIENT
Start: 2025-07-07 | End: 2025-07-07

## 2025-07-07 RX ORDER — PROPOFOL 10 MG/ML
INJECTION, EMULSION INTRAVENOUS AS NEEDED
Status: DISCONTINUED | OUTPATIENT
Start: 2025-07-07 | End: 2025-07-07

## 2025-07-07 RX ADMIN — PROPOFOL 20 MG: 10 INJECTION, EMULSION INTRAVENOUS at 07:27

## 2025-07-07 RX ADMIN — PROPOFOL 20 MG: 10 INJECTION, EMULSION INTRAVENOUS at 07:37

## 2025-07-07 RX ADMIN — PROPOFOL 20 MG: 10 INJECTION, EMULSION INTRAVENOUS at 07:26

## 2025-07-07 RX ADMIN — Medication 200 MCG: at 07:31

## 2025-07-07 RX ADMIN — PROPOFOL 90 MG: 10 INJECTION, EMULSION INTRAVENOUS at 07:25

## 2025-07-07 RX ADMIN — PROPOFOL 30 MG: 10 INJECTION, EMULSION INTRAVENOUS at 07:28

## 2025-07-07 RX ADMIN — Medication 100 MCG: at 07:33

## 2025-07-07 RX ADMIN — PROPOFOL 20 MG: 10 INJECTION, EMULSION INTRAVENOUS at 07:31

## 2025-07-07 RX ADMIN — LIDOCAINE HYDROCHLORIDE 100 MG: 20 INJECTION, SOLUTION EPIDURAL; INFILTRATION; INTRACAUDAL; PERINEURAL at 07:25

## 2025-07-07 RX ADMIN — SODIUM CHLORIDE, SODIUM LACTATE, POTASSIUM CHLORIDE, AND CALCIUM CHLORIDE: .6; .31; .03; .02 INJECTION, SOLUTION INTRAVENOUS at 07:22

## 2025-07-07 NOTE — ANESTHESIA POSTPROCEDURE EVALUATION
Post-Op Assessment Note    CV Status:  Stable    Pain management: adequate       Mental Status:  Alert and awake   Hydration Status:  Euvolemic   PONV Controlled:  Controlled   Airway Patency:  Patent     Post Op Vitals Reviewed: Yes    No anethesia notable event occurred.    Staff: CRNA           Last Filed PACU Vitals:  Vitals Value Taken Time   Temp 97.6 °F (36.4 °C) 07/07/25 07:45   Pulse 84 07/07/25 07:45   BP 94/58 07/07/25 07:45   Resp 22 07/07/25 07:45   SpO2 97 % 07/07/25 07:45

## 2025-07-07 NOTE — ANESTHESIA PREPROCEDURE EVALUATION
Procedure:  COLONOSCOPY    Relevant Problems   ANESTHESIA (within normal limits)      CARDIO   (+) HTN (hypertension)      ENDO   (+) Diabetes mellitus, type 2 (HCC)      GI/HEPATIC (within normal limits)      /RENAL (within normal limits)      GYN (within normal limits)      HEMATOLOGY (within normal limits)      MUSCULOSKELETAL (within normal limits)      NEURO/PSYCH (within normal limits)      PULMONARY   (+) Asthma        Physical Exam    Airway     Mallampati score: II  TM Distance: >3 FB  Neck ROM: full      Cardiovascular  Cardiovascular exam normal    Dental        Pulmonary  Pulmonary exam normal     Neurological  - normal exam    Other Findings        Anesthesia Plan  ASA Score- 2     Anesthesia Type- IV sedation with anesthesia with ASA Monitors.         Additional Monitors:     Airway Plan:            Plan Factors-Exercise tolerance (METS): >4 METS.    Chart reviewed.  Imaging results reviewed. Existing labs reviewed. Patient summary reviewed.    Patient is not a current smoker.              Induction- intravenous.    Postoperative Plan- .   Monitoring Plan - Monitoring plan - standard ASA monitoring  Post Operative Pain Plan - non-opiod analgesics    Perioperative Resuscitation Plan - Level 1 - Full Code.       Informed Consent- Anesthetic plan and risks discussed with patient.        NPO Status:  Vitals Value Taken Time   Date of last liquid 07/06/25 07/07/25 06:36   Time of last liquid 2200 07/07/25 06:36   Date of last solid 07/05/25 07/07/25 06:36   Time of last solid 1900 07/07/25 06:36     Discussed IV Sedation with General Anesthesia as backup with patient including but not limited to risk of cardiac insult, pulmonary complication, stroke, reaction to medications and death. All questions answered and consent was obtained.

## 2025-07-07 NOTE — H&P
"History and Physical -  [facility]  Shemar Vergara 65 y.o. male MRN: [id number]                  HPI: Shemar Vergara is a 65 y.o. year old male who presents for colonoscopy for colon cancer screening      REVIEW OF SYSTEMS: Per the HPI, and otherwise unremarkable.    Historical Information   Past Medical History[1]  Past Surgical History[2]  Social History   Social History     Substance and Sexual Activity   Alcohol Use Yes    Comment: occasionally     Social History     Substance and Sexual Activity   Drug Use No     Tobacco Use History[3]  Family History[4]    Meds/Allergies     Not in a hospital admission.    Allergies[5]    Objective     Blood pressure 128/83, pulse 94, temperature 97.7 °F (36.5 °C), temperature source Temporal, resp. rate 20, height 5' 10\" (1.778 m), weight 89.1 kg (196 lb 6.9 oz), SpO2 98%.      PHYSICAL EXAM    /83   Pulse 94   Temp 97.7 °F (36.5 °C) (Temporal)   Resp 20   Ht 5' 10\" (1.778 m)   Wt 89.1 kg (196 lb 6.9 oz)   SpO2 98%   BMI 28.18 kg/m²       Gen: NAD  CV: RRR  CHEST: Clear  ABD: soft, NT/ND  EXT: no edema      ASSESSMENT/PLAN:  This is a 65 y.o. year old male here for colonoscopy, and he is stable and optimized for his procedure.             [1]   Past Medical History:  Diagnosis Date    Asthma     Back pain     Diabetes mellitus (HCC)     Hyperlipidemia     Hypertension    [2]   Past Surgical History:  Procedure Laterality Date    COLONOSCOPY      KNEE SURGERY      UPPER GASTROINTESTINAL ENDOSCOPY     [3]   Social History  Tobacco Use   Smoking Status Never   Smokeless Tobacco Never   [4]   Family History  Problem Relation Name Age of Onset    Hypertension Mother      Hypertension Father     [5]   Allergies  Allergen Reactions    Iodinated Contrast Media Hives     Pt reports itching.    Omnipaque [Iohexol]      "

## 2025-07-10 PROCEDURE — 88305 TISSUE EXAM BY PATHOLOGIST: CPT | Performed by: PATHOLOGY
